# Patient Record
Sex: FEMALE | URBAN - METROPOLITAN AREA
[De-identification: names, ages, dates, MRNs, and addresses within clinical notes are randomized per-mention and may not be internally consistent; named-entity substitution may affect disease eponyms.]

---

## 2023-09-13 ENCOUNTER — WEB ENCOUNTER (OUTPATIENT)
Dept: URBAN - METROPOLITAN AREA CLINIC 6 | Facility: CLINIC | Age: 73
End: 2023-09-13

## 2023-09-21 ENCOUNTER — WEB ENCOUNTER (OUTPATIENT)
Dept: URBAN - METROPOLITAN AREA CLINIC 54 | Facility: CLINIC | Age: 73
End: 2023-09-21

## 2023-09-27 ENCOUNTER — OFFICE VISIT (OUTPATIENT)
Dept: URBAN - METROPOLITAN AREA CLINIC 54 | Facility: CLINIC | Age: 73
End: 2023-09-27
Payer: MEDICARE

## 2023-09-27 ENCOUNTER — DASHBOARD ENCOUNTERS (OUTPATIENT)
Age: 73
End: 2023-09-27

## 2023-09-27 VITALS
DIASTOLIC BLOOD PRESSURE: 85 MMHG | HEART RATE: 65 BPM | WEIGHT: 166 LBS | SYSTOLIC BLOOD PRESSURE: 154 MMHG | BODY MASS INDEX: 30.55 KG/M2 | TEMPERATURE: 97.1 F | HEIGHT: 62 IN

## 2023-09-27 DIAGNOSIS — K21.9 GASTROESOPHAGEAL REFLUX DISEASE, UNSPECIFIED WHETHER ESOPHAGITIS PRESENT: ICD-10-CM

## 2023-09-27 DIAGNOSIS — M81.0 OSTEOPOROSIS WITHOUT CURRENT PATHOLOGICAL FRACTURE, UNSPECIFIED OSTEOPOROSIS TYPE: ICD-10-CM

## 2023-09-27 DIAGNOSIS — K22.719 BARRETT'S ESOPHAGUS WITH DYSPLASIA: ICD-10-CM

## 2023-09-27 PROBLEM — 235595009: Status: ACTIVE | Noted: 2023-09-27

## 2023-09-27 PROBLEM — 1082751000119109: Status: ACTIVE | Noted: 2023-09-27

## 2023-09-27 PROBLEM — 64859006: Status: ACTIVE | Noted: 2023-09-27

## 2023-09-27 PROCEDURE — 99204 OFFICE O/P NEW MOD 45 MIN: CPT

## 2023-09-27 RX ORDER — RABEPRAZOLE SODIUM 20 MG/1
1 TABLET TABLET, DELAYED RELEASE ORAL ONCE A DAY
Status: ACTIVE | COMMUNITY

## 2023-09-27 RX ORDER — ALENDRONATE SODIUM 35 MG/1
1 TABLET 30 MINUTES BEFORE THE FIRST FOOD, BEVERAGE OR MEDICINE OF THE DAY WITH PLAIN WATER TABLET ORAL
Status: ON HOLD | COMMUNITY

## 2023-09-27 RX ORDER — RABEPRAZOLE SODIUM 20 MG/1
1 TABLET TABLET, DELAYED RELEASE ORAL ONCE A DAY
Qty: 90 TABLET | Refills: 1 | OUTPATIENT

## 2023-09-27 RX ORDER — SIMVASTATIN 20 MG
1 TABLET IN THE EVENING TABLET ORAL ONCE A DAY
Status: ACTIVE | COMMUNITY

## 2023-09-27 NOTE — HPI-TODAY'S VISIT:
9/27/23: Patient is a 72 yo female with PMH of HLD, prediabetes, GERD, and Foster's esophagus s/p RFA in 2012 and 2013 who presents to establish care after moving here from [place]. Pt's GERD is well controlled on rabeprazole 20mg QD. She recently tried to wean off and had recurrent symptoms after a few days, so restarted med. Pt had an abnormal DEXA scan with PCP and was prescribed alendronate, but concerned about starting it as she read it can cause esophageal disease. Last EGD was in 9/2022 with no dysplasia. She has been on a 2 year surveillance schedule for several years.

## 2024-09-25 ENCOUNTER — OFFICE VISIT (OUTPATIENT)
Dept: URBAN - METROPOLITAN AREA CLINIC 54 | Facility: CLINIC | Age: 74
End: 2024-09-25

## 2024-11-13 ENCOUNTER — OFFICE VISIT (OUTPATIENT)
Dept: URBAN - METROPOLITAN AREA CLINIC 54 | Facility: CLINIC | Age: 74
End: 2024-11-13

## 2025-01-08 ENCOUNTER — OFFICE VISIT (OUTPATIENT)
Dept: URBAN - METROPOLITAN AREA CLINIC 54 | Facility: CLINIC | Age: 75
End: 2025-01-08

## 2025-01-09 ENCOUNTER — OFFICE VISIT (OUTPATIENT)
Dept: URBAN - METROPOLITAN AREA CLINIC 54 | Facility: CLINIC | Age: 75
End: 2025-01-09

## 2025-02-06 ENCOUNTER — OFFICE VISIT (OUTPATIENT)
Dept: URBAN - METROPOLITAN AREA CLINIC 54 | Facility: CLINIC | Age: 75
End: 2025-02-06

## 2025-04-24 ENCOUNTER — OFFICE VISIT (OUTPATIENT)
Dept: URBAN - METROPOLITAN AREA CLINIC 54 | Facility: CLINIC | Age: 75
End: 2025-04-24
Payer: MEDICARE

## 2025-04-24 ENCOUNTER — LAB OUTSIDE AN ENCOUNTER (OUTPATIENT)
Dept: URBAN - METROPOLITAN AREA CLINIC 54 | Facility: CLINIC | Age: 75
End: 2025-04-24

## 2025-04-24 DIAGNOSIS — K22.719 BARRETT'S ESOPHAGUS WITH DYSPLASIA: ICD-10-CM

## 2025-04-24 PROCEDURE — 99213 OFFICE O/P EST LOW 20 MIN: CPT | Performed by: INTERNAL MEDICINE

## 2025-04-24 RX ORDER — RABEPRAZOLE SODIUM 20 MG/1
1 TABLET TABLET, DELAYED RELEASE ORAL ONCE A DAY
Qty: 90 TABLET | Refills: 1 | Status: ON HOLD | COMMUNITY

## 2025-04-24 RX ORDER — SIMVASTATIN 20 MG
1 TABLET IN THE EVENING TABLET ORAL ONCE A DAY
Status: ACTIVE | COMMUNITY

## 2025-04-24 NOTE — HPI-TODAY'S VISIT:
.Foster's esophagus there may have been dysplasia with radiofrequency ablation in 2012 and/or 2013.  Her last upper endoscopy in [facility] in 2022 was performed with only a 1 cm segment of salmon-colored mucosa with Foster's epithelium without dysplasia.  A colonoscopy at that time was normal as well.  She feels well.  She has been off rabeprazole for the past 2 years and next experiencing no heartburn no dysphagia no nausea or abdominal pain.  She has no cardiac or respiratory issues she does take alendronate for osteoporosis.  He was cautioned about the need to swallow it carefully with plenty of water and to remain upright.  There is no family history of esophageal or colon cancer.  Has no prior intolerance of anesthesia.

## 2025-06-25 ENCOUNTER — OFFICE VISIT (OUTPATIENT)
Dept: URBAN - METROPOLITAN AREA SURGERY CENTER 14 | Facility: SURGERY CENTER | Age: 75
End: 2025-06-25

## 2025-06-25 ENCOUNTER — TELEPHONE ENCOUNTER (OUTPATIENT)
Dept: URBAN - METROPOLITAN AREA CLINIC 54 | Facility: CLINIC | Age: 75
End: 2025-06-25

## 2025-06-25 ENCOUNTER — LAB OUTSIDE AN ENCOUNTER (OUTPATIENT)
Dept: URBAN - METROPOLITAN AREA CLINIC 54 | Facility: CLINIC | Age: 75
End: 2025-06-25

## 2025-06-25 RX ORDER — RABEPRAZOLE SODIUM 20 MG/1
1 TABLET TABLET, DELAYED RELEASE ORAL ONCE A DAY
Qty: 90 TABLET | Refills: 1 | Status: ON HOLD | COMMUNITY

## 2025-06-25 RX ORDER — SIMVASTATIN 20 MG
1 TABLET IN THE EVENING TABLET ORAL ONCE A DAY
Status: ACTIVE | COMMUNITY

## 2025-07-14 ENCOUNTER — OFFICE VISIT (OUTPATIENT)
Dept: URBAN - METROPOLITAN AREA CLINIC 54 | Facility: CLINIC | Age: 75
End: 2025-07-14

## 2025-08-14 ENCOUNTER — OFFICE VISIT (OUTPATIENT)
Dept: URBAN - METROPOLITAN AREA MEDICAL CENTER 24 | Facility: MEDICAL CENTER | Age: 75
End: 2025-08-14

## 2025-08-14 RX ORDER — RABEPRAZOLE SODIUM 20 MG/1
1 TABLET TABLET, DELAYED RELEASE ORAL ONCE A DAY
Qty: 90 TABLET | Refills: 1 | Status: ON HOLD | COMMUNITY

## 2025-08-14 RX ORDER — SIMVASTATIN 20 MG
1 TABLET IN THE EVENING TABLET ORAL ONCE A DAY
Status: ACTIVE | COMMUNITY

## 2025-08-28 ENCOUNTER — TELEPHONE ENCOUNTER (OUTPATIENT)
Dept: URBAN - METROPOLITAN AREA CLINIC 54 | Facility: CLINIC | Age: 75
End: 2025-08-28